# Patient Record
Sex: MALE | Race: WHITE | NOT HISPANIC OR LATINO | Employment: FULL TIME | URBAN - METROPOLITAN AREA
[De-identification: names, ages, dates, MRNs, and addresses within clinical notes are randomized per-mention and may not be internally consistent; named-entity substitution may affect disease eponyms.]

---

## 2023-05-10 ENCOUNTER — APPOINTMENT (OUTPATIENT)
Dept: PRIMARY CARE | Facility: CLINIC | Age: 38
End: 2023-05-10
Payer: COMMERCIAL

## 2023-10-13 ENCOUNTER — LAB (OUTPATIENT)
Dept: LAB | Facility: LAB | Age: 38
End: 2023-10-13

## 2023-10-13 ENCOUNTER — OFFICE VISIT (OUTPATIENT)
Dept: PRIMARY CARE | Facility: CLINIC | Age: 38
End: 2023-10-13
Payer: COMMERCIAL

## 2023-10-13 VITALS
WEIGHT: 146 LBS | SYSTOLIC BLOOD PRESSURE: 120 MMHG | BODY MASS INDEX: 22.91 KG/M2 | DIASTOLIC BLOOD PRESSURE: 70 MMHG | RESPIRATION RATE: 16 BRPM | HEIGHT: 67 IN | TEMPERATURE: 97.8 F | HEART RATE: 71 BPM

## 2023-10-13 DIAGNOSIS — Z00.00 PHYSICAL EXAM: ICD-10-CM

## 2023-10-13 DIAGNOSIS — N52.9 ERECTILE DYSFUNCTION, UNSPECIFIED ERECTILE DYSFUNCTION TYPE: Primary | ICD-10-CM

## 2023-10-13 LAB
ALBUMIN SERPL BCP-MCNC: 4.4 G/DL (ref 3.4–5)
ALP SERPL-CCNC: 35 U/L (ref 33–120)
ALT SERPL W P-5'-P-CCNC: 12 U/L (ref 10–52)
ANION GAP SERPL CALC-SCNC: 13 MMOL/L (ref 10–20)
AST SERPL W P-5'-P-CCNC: 15 U/L (ref 9–39)
BILIRUB SERPL-MCNC: 0.8 MG/DL (ref 0–1.2)
BUN SERPL-MCNC: 13 MG/DL (ref 6–23)
CALCIUM SERPL-MCNC: 9.8 MG/DL (ref 8.6–10.3)
CHLORIDE SERPL-SCNC: 106 MMOL/L (ref 98–107)
CHOLEST SERPL-MCNC: 194 MG/DL (ref 0–199)
CHOLESTEROL/HDL RATIO: 3.8
CO2 SERPL-SCNC: 30 MMOL/L (ref 21–32)
CREAT SERPL-MCNC: 0.88 MG/DL (ref 0.5–1.3)
ERYTHROCYTE [DISTWIDTH] IN BLOOD BY AUTOMATED COUNT: 12.4 % (ref 11.5–14.5)
GFR SERPL CREATININE-BSD FRML MDRD: >90 ML/MIN/1.73M*2
GLUCOSE SERPL-MCNC: 83 MG/DL (ref 74–99)
HCT VFR BLD AUTO: 46 % (ref 41–52)
HDLC SERPL-MCNC: 51.3 MG/DL
HGB BLD-MCNC: 14.8 G/DL (ref 13.5–17.5)
LDLC SERPL CALC-MCNC: 130 MG/DL
MCH RBC QN AUTO: 29.7 PG (ref 26–34)
MCHC RBC AUTO-ENTMCNC: 32.2 G/DL (ref 32–36)
MCV RBC AUTO: 92 FL (ref 80–100)
NON HDL CHOLESTEROL: 143 MG/DL (ref 0–149)
NRBC BLD-RTO: 0 /100 WBCS (ref 0–0)
PLATELET # BLD AUTO: 219 X10*3/UL (ref 150–450)
PMV BLD AUTO: 10.6 FL (ref 7.5–11.5)
POTASSIUM SERPL-SCNC: 4.9 MMOL/L (ref 3.5–5.3)
PROT SERPL-MCNC: 6.6 G/DL (ref 6.4–8.2)
RBC # BLD AUTO: 4.98 X10*6/UL (ref 4.5–5.9)
SODIUM SERPL-SCNC: 144 MMOL/L (ref 136–145)
TRIGL SERPL-MCNC: 64 MG/DL (ref 0–149)
TSH SERPL-ACNC: 0.63 MIU/L (ref 0.44–3.98)
VLDL: 13 MG/DL (ref 0–40)
WBC # BLD AUTO: 5.8 X10*3/UL (ref 4.4–11.3)

## 2023-10-13 PROCEDURE — 99213 OFFICE O/P EST LOW 20 MIN: CPT | Performed by: FAMILY MEDICINE

## 2023-10-13 PROCEDURE — 80053 COMPREHEN METABOLIC PANEL: CPT

## 2023-10-13 PROCEDURE — 84443 ASSAY THYROID STIM HORMONE: CPT

## 2023-10-13 PROCEDURE — 1036F TOBACCO NON-USER: CPT | Performed by: FAMILY MEDICINE

## 2023-10-13 PROCEDURE — 85027 COMPLETE CBC AUTOMATED: CPT

## 2023-10-13 PROCEDURE — 80061 LIPID PANEL: CPT

## 2023-10-13 PROCEDURE — 36415 COLL VENOUS BLD VENIPUNCTURE: CPT

## 2023-10-13 RX ORDER — SILDENAFIL 50 MG/1
50 TABLET, FILM COATED ORAL DAILY PRN
Qty: 10 TABLET | Refills: 3 | Status: SHIPPED | OUTPATIENT
Start: 2023-10-13 | End: 2023-11-09 | Stop reason: SDUPTHER
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
## 2023-11-03 ENCOUNTER — TELEPHONE (OUTPATIENT)
Dept: PRIMARY CARE | Facility: CLINIC | Age: 38
End: 2023-11-03
Payer: COMMERCIAL

## 2023-11-03 NOTE — TELEPHONE ENCOUNTER
Pt saw you 10/13/23 and you marked in his office notes he was here for a physical exam as well as problem visit, but only coded a 32036 but did not do a preventative visit code for the physical, only on his labs.     Can you add the Physical preventative billing code to that office visit on 10/13/23, or do you need him to come back for a separate physical visit?    He needs to have a physical exam billed to his insurance prior to 12/31/23.    Please advise.

## 2023-11-03 NOTE — TELEPHONE ENCOUNTER
He did not get a bill, he needs for insurance to get a discount on his premium.   If you prefer him to come in again for another visit to bill as a physical, instead of adding code to 10/13/23 visit, he is willing to do so

## 2023-11-09 ENCOUNTER — OFFICE VISIT (OUTPATIENT)
Dept: PRIMARY CARE | Facility: CLINIC | Age: 38
End: 2023-11-09
Payer: COMMERCIAL

## 2023-11-09 VITALS
BODY MASS INDEX: 22.71 KG/M2 | WEIGHT: 145 LBS | TEMPERATURE: 97.1 F | SYSTOLIC BLOOD PRESSURE: 110 MMHG | DIASTOLIC BLOOD PRESSURE: 70 MMHG | HEART RATE: 96 BPM

## 2023-11-09 DIAGNOSIS — E78.5 HYPERLIPIDEMIA, UNSPECIFIED HYPERLIPIDEMIA TYPE: ICD-10-CM

## 2023-11-09 DIAGNOSIS — N52.9 ERECTILE DYSFUNCTION, UNSPECIFIED ERECTILE DYSFUNCTION TYPE: Primary | ICD-10-CM

## 2023-11-09 PROBLEM — S33.5XXA LUMBAR SPRAIN: Status: ACTIVE | Noted: 2023-11-09

## 2023-11-09 PROBLEM — R31.9 HEMATURIA: Status: ACTIVE | Noted: 2023-11-09
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
## 2023-11-09 PROBLEM — S80.01XA CONTUSION OF KNEE, RIGHT: Status: ACTIVE | Noted: 2023-11-09

## 2023-11-09 PROBLEM — R21 PENILE RASH: Status: ACTIVE | Noted: 2023-11-09

## 2023-11-09 PROBLEM — S80.11XA CONTUSION OF RIGHT LOWER LEG: Status: ACTIVE | Noted: 2023-11-09

## 2023-11-09 PROBLEM — R10.9 ABDOMINAL PAIN: Status: ACTIVE | Noted: 2023-11-09

## 2023-11-09 PROBLEM — S63.501A SPRAIN OF WRIST, RIGHT: Status: ACTIVE | Noted: 2023-11-09

## 2023-11-09 PROBLEM — L72.9 SCROTAL CYST: Status: ACTIVE | Noted: 2023-11-09

## 2023-11-09 PROBLEM — N50.82 SCROTAL PAIN: Status: ACTIVE | Noted: 2023-11-09

## 2023-11-09 PROBLEM — F32.A DEPRESSION: Status: ACTIVE | Noted: 2023-11-09

## 2023-11-09 PROBLEM — F41.9 ANXIETY DISORDER: Status: ACTIVE | Noted: 2023-11-09

## 2023-11-09 PROBLEM — E66.3 OVERWEIGHT WITH BODY MASS INDEX (BMI) OF 28 TO 28.9 IN ADULT: Status: ACTIVE | Noted: 2023-11-09

## 2023-11-09 PROBLEM — G47.00 INSOMNIA: Status: ACTIVE | Noted: 2023-11-09

## 2023-11-09 PROBLEM — S70.01XA CONTUSION OF HIP, RIGHT: Status: ACTIVE | Noted: 2023-11-09

## 2023-11-09 PROBLEM — K57.32 DIVERTICULITIS OF COLON: Status: ACTIVE | Noted: 2023-11-09

## 2023-11-09 PROBLEM — L05.01 PILONIDAL CYST WITH ABSCESS: Status: ACTIVE | Noted: 2023-11-09

## 2023-11-09 PROBLEM — B37.0 ORAL THRUSH: Status: ACTIVE | Noted: 2023-11-09

## 2023-11-09 PROCEDURE — 99395 PREV VISIT EST AGE 18-39: CPT | Performed by: FAMILY MEDICINE

## 2023-11-09 PROCEDURE — 1036F TOBACCO NON-USER: CPT | Performed by: FAMILY MEDICINE

## 2023-11-09 RX ORDER — ALPRAZOLAM 0.5 MG/1
0.5 TABLET ORAL 2 TIMES DAILY PRN
COMMUNITY
Start: 2020-04-23 | End: 2024-03-08 | Stop reason: WASHOUT

## 2023-11-09 RX ORDER — ACETAMINOPHEN 325 MG/1
650 TABLET ORAL EVERY 6 HOURS PRN
COMMUNITY
Start: 2019-09-30

## 2023-11-09 RX ORDER — PERMETHRIN 50 MG/G
CREAM TOPICAL
COMMUNITY

## 2023-11-09 RX ORDER — ONDANSETRON 4 MG/1
TABLET, FILM COATED ORAL
COMMUNITY
End: 2024-03-08 | Stop reason: WASHOUT

## 2023-11-09 RX ORDER — TADALAFIL 5 MG/1
1 TABLET ORAL DAILY
COMMUNITY
Start: 2020-02-11

## 2023-11-09 RX ORDER — CLOTRIMAZOLE AND BETAMETHASONE DIPROPIONATE 10; .64 MG/G; MG/G
CREAM TOPICAL 2 TIMES DAILY
COMMUNITY
Start: 2020-02-11

## 2023-11-09 RX ORDER — SILDENAFIL 50 MG/1
50 TABLET, FILM COATED ORAL DAILY PRN
Qty: 30 TABLET | Refills: 2 | Status: SHIPPED | OUTPATIENT
Start: 2023-11-09 | End: 2024-11-08
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
## 2024-03-08 ENCOUNTER — TELEMEDICINE (OUTPATIENT)
Dept: PRIMARY CARE | Facility: CLINIC | Age: 39
End: 2024-03-08
Payer: COMMERCIAL

## 2024-03-08 ENCOUNTER — APPOINTMENT (OUTPATIENT)
Dept: PRIMARY CARE | Facility: CLINIC | Age: 39
End: 2024-03-08
Payer: COMMERCIAL

## 2024-03-08 ENCOUNTER — LAB (OUTPATIENT)
Dept: LAB | Facility: LAB | Age: 39
End: 2024-03-08
Payer: COMMERCIAL

## 2024-03-08 DIAGNOSIS — B37.0 ORAL THRUSH: ICD-10-CM

## 2024-03-08 DIAGNOSIS — R63.4 WEIGHT LOSS: ICD-10-CM

## 2024-03-08 DIAGNOSIS — B37.0 ORAL THRUSH: Primary | ICD-10-CM

## 2024-03-08 DIAGNOSIS — R70.0 ELEVATED SED RATE: ICD-10-CM

## 2024-03-08 DIAGNOSIS — R79.82 CRP ELEVATED: ICD-10-CM

## 2024-03-08 DIAGNOSIS — E78.5 HYPERLIPIDEMIA, UNSPECIFIED HYPERLIPIDEMIA TYPE: ICD-10-CM

## 2024-03-08 LAB
ALBUMIN SERPL BCP-MCNC: 4.5 G/DL (ref 3.4–5)
ALP SERPL-CCNC: 35 U/L (ref 33–120)
ALT SERPL W P-5'-P-CCNC: 12 U/L (ref 10–52)
ANION GAP SERPL CALC-SCNC: 8 MMOL/L (ref 10–20)
AST SERPL W P-5'-P-CCNC: 13 U/L (ref 9–39)
BASOPHILS # BLD AUTO: 0.05 X10*3/UL (ref 0–0.1)
BASOPHILS NFR BLD AUTO: 0.9 %
BILIRUB SERPL-MCNC: 0.7 MG/DL (ref 0–1.2)
BUN SERPL-MCNC: 14 MG/DL (ref 6–23)
CALCIUM SERPL-MCNC: 9.2 MG/DL (ref 8.6–10.3)
CHLORIDE SERPL-SCNC: 105 MMOL/L (ref 98–107)
CHOLEST SERPL-MCNC: 189 MG/DL (ref 0–199)
CHOLESTEROL/HDL RATIO: 3.3
CO2 SERPL-SCNC: 32 MMOL/L (ref 21–32)
CREAT SERPL-MCNC: 0.81 MG/DL (ref 0.5–1.3)
CRP SERPL-MCNC: <0.1 MG/DL
EGFRCR SERPLBLD CKD-EPI 2021: >90 ML/MIN/1.73M*2
EOSINOPHIL # BLD AUTO: 0.05 X10*3/UL (ref 0–0.7)
EOSINOPHIL NFR BLD AUTO: 0.9 %
ERYTHROCYTE [DISTWIDTH] IN BLOOD BY AUTOMATED COUNT: 12.5 % (ref 11.5–14.5)
ERYTHROCYTE [SEDIMENTATION RATE] IN BLOOD BY WESTERGREN METHOD: 3 MM/H (ref 0–15)
GLUCOSE SERPL-MCNC: 91 MG/DL (ref 74–99)
HCT VFR BLD AUTO: 45.4 % (ref 41–52)
HDLC SERPL-MCNC: 57.9 MG/DL
HGB BLD-MCNC: 14.5 G/DL (ref 13.5–17.5)
HIV 1+2 AB+HIV1 P24 AG SERPL QL IA: NONREACTIVE
IMM GRANULOCYTES # BLD AUTO: 0.02 X10*3/UL (ref 0–0.7)
IMM GRANULOCYTES NFR BLD AUTO: 0.4 % (ref 0–0.9)
LDLC SERPL CALC-MCNC: 118 MG/DL
LYMPHOCYTES # BLD AUTO: 1.76 X10*3/UL (ref 1.2–4.8)
LYMPHOCYTES NFR BLD AUTO: 32.8 %
MCH RBC QN AUTO: 29.7 PG (ref 26–34)
MCHC RBC AUTO-ENTMCNC: 31.9 G/DL (ref 32–36)
MCV RBC AUTO: 93 FL (ref 80–100)
MONOCYTES # BLD AUTO: 0.45 X10*3/UL (ref 0.1–1)
MONOCYTES NFR BLD AUTO: 8.4 %
NEUTROPHILS # BLD AUTO: 3.03 X10*3/UL (ref 1.2–7.7)
NEUTROPHILS NFR BLD AUTO: 56.6 %
NON HDL CHOLESTEROL: 131 MG/DL (ref 0–149)
NRBC BLD-RTO: 0 /100 WBCS (ref 0–0)
PLATELET # BLD AUTO: 210 X10*3/UL (ref 150–450)
POTASSIUM SERPL-SCNC: 3.8 MMOL/L (ref 3.5–5.3)
PROT SERPL-MCNC: 6.8 G/DL (ref 6.4–8.2)
RBC # BLD AUTO: 4.88 X10*6/UL (ref 4.5–5.9)
SODIUM SERPL-SCNC: 141 MMOL/L (ref 136–145)
TRIGL SERPL-MCNC: 65 MG/DL (ref 0–149)
TSH SERPL-ACNC: 1.28 MIU/L (ref 0.44–3.98)
VLDL: 13 MG/DL (ref 0–40)
WBC # BLD AUTO: 5.4 X10*3/UL (ref 4.4–11.3)

## 2024-03-08 PROCEDURE — 86140 C-REACTIVE PROTEIN: CPT

## 2024-03-08 PROCEDURE — 80061 LIPID PANEL: CPT

## 2024-03-08 PROCEDURE — 1036F TOBACCO NON-USER: CPT | Performed by: FAMILY MEDICINE

## 2024-03-08 PROCEDURE — 85652 RBC SED RATE AUTOMATED: CPT

## 2024-03-08 PROCEDURE — 80053 COMPREHEN METABOLIC PANEL: CPT

## 2024-03-08 PROCEDURE — 99214 OFFICE O/P EST MOD 30 MIN: CPT | Performed by: FAMILY MEDICINE

## 2024-03-08 PROCEDURE — 87389 HIV-1 AG W/HIV-1&-2 AB AG IA: CPT

## 2024-03-08 PROCEDURE — 36415 COLL VENOUS BLD VENIPUNCTURE: CPT

## 2024-03-08 PROCEDURE — 86696 HERPES SIMPLEX TYPE 2 TEST: CPT

## 2024-03-08 PROCEDURE — 86780 TREPONEMA PALLIDUM: CPT

## 2024-03-08 PROCEDURE — 84443 ASSAY THYROID STIM HORMONE: CPT

## 2024-03-08 PROCEDURE — 85025 COMPLETE CBC W/AUTO DIFF WBC: CPT

## 2024-03-08 PROCEDURE — 86695 HERPES SIMPLEX TYPE 1 TEST: CPT

## 2024-03-08 RX ORDER — AMOXICILLIN 500 MG/1
500 TABLET, FILM COATED ORAL EVERY 12 HOURS SCHEDULED
Qty: 14 TABLET | Refills: 0 | Status: SHIPPED | OUTPATIENT
Start: 2024-03-08 | End: 2024-03-15

## 2024-03-08 RX ORDER — FLUCONAZOLE 150 MG/1
150 TABLET ORAL ONCE
Qty: 1 TABLET | Refills: 0 | Status: SHIPPED | OUTPATIENT
Start: 2024-03-08 | End: 2024-03-08

## 2024-03-08 NOTE — PROGRESS NOTES
Virtual or Telephone Consent    An interactive audio and video telecommunication system which permits real time communications between the patient (at the originating site) and provider (at the distant site) was utilized to provide this telehealth service.   Verbal consent was requested and obtained from Samuel Manriquez on this date, 03/08/24 for a telehealth visit.       Subjective   Patient ID: Samuel Manriquez is a 38 y.o. male who presents for oral thrush   HPI  Patient contacted us today for oral thrush that he noted 3 days ago.  He reported that he had this issue back in 2019, requiring biopsy.  Eventually he got strep throat, and COVID-19 simultaneously and treated with amoxicillin which treated infection.  Now he has whitish thrush on the tongue and the cheeks, and he noted them when he scrapes his tongue.  No fever, no chills, no change bowel habits, vision lifestyle.  There is antibiotic use.  Patient is adamant that amoxicillin help treat his previous oral thrush.  Review of Systems    Past Medical History:   Diagnosis Date    Personal history of diseases of the skin and subcutaneous tissue     History of pilonidal cyst    Personal history of other mental and behavioral disorders 06/09/2013    History of anxiety disorder    Rash and other nonspecific skin eruption     Rash       Past Surgical History:   Procedure Laterality Date    OTHER SURGICAL HISTORY  07/24/2013    Surgery Testis    PILONIDAL CYST DRAINAGE  07/24/2013    Pilonidal Cyst Resection      Social History     Socioeconomic History    Marital status: Single     Spouse name: Not on file    Number of children: Not on file    Years of education: Not on file    Highest education level: Not on file   Occupational History    Not on file   Tobacco Use    Smoking status: Former     Types: Cigarettes    Smokeless tobacco: Never   Substance and Sexual Activity    Alcohol use: Yes    Drug use: Yes     Types: Marijuana    Sexual activity: Not on  file   Other Topics Concern    Not on file   Social History Narrative    Not on file     Social Determinants of Health     Financial Resource Strain: Not on file   Food Insecurity: Not on file   Transportation Needs: Not on file   Physical Activity: Not on file   Stress: Not on file   Social Connections: Not on file   Intimate Partner Violence: Not on file   Housing Stability: Not on file      Family History   Problem Relation Name Age of Onset    Heart block Mother      Heart block Father         MEDICATIONS AND ALLERGIES:    ALLERGIES Patient has no known allergies.    MEDICATIONS   Current Outpatient Medications on File Prior to Visit   Medication Sig Dispense Refill    acetaminophen (Tylenol) 325 mg tablet Take 2 tablets (650 mg) by mouth every 6 hours if needed.      clotrimazole-betamethasone (Lotrisone) cream Apply topically twice a day. (AM AND PM).      permethrin (Elimite) 5 % cream       sildenafil (Viagra) 50 mg tablet Take 1 tablet (50 mg) by mouth once daily as needed for erectile dysfunction. 30 tablet 2    tadalafil (Cialis) 5 mg tablet Take 1 tablet (5 mg) by mouth once daily.      [DISCONTINUED] ALPRAZolam (Xanax) 0.5 mg tablet Take 1 tablet (0.5 mg) by mouth 2 times a day as needed for anxiety.      [DISCONTINUED] ondansetron (Zofran) 4 mg tablet        No current facility-administered medications on file prior to visit.        Objective   Visit Vitals  Smoking Status Former      Physical Exam    On this video visit, we are able to notice white thrush on the tongue.      1. Oral thrush  For the oral thrush, will order the labs below, will treat with Diflucan, patient wanted amoxicillin, we advised her to use Diflucan first, he can try the amoxicillin if Diflucan does not work, however we explained that oral thrush most likely Candida and require antifungal and antibiotics.  There is always a chance that antibiotic makes it worse.  Patient understand the risk.  - CBC and Auto Differential;  Future  - Comprehensive Metabolic Panel; Future  - HIV 1/2 Antigen/Antibody Screen with Reflex to Confirmation; Future  - HSV1 IgG and HSV2 IgG; Future  - Syphilis Screen with Reflex; Future  - fluconazole (Diflucan) 150 mg tablet; Take 1 tablet (150 mg) by mouth 1 time for 1 dose.  Dispense: 1 tablet; Refill: 0  - amoxicillin (Amoxil) 500 mg tablet; Take 1 tablet (500 mg) by mouth every 12 hours for 7 days.  Dispense: 14 tablet; Refill: 0  - Referral to Gastroenterology; Future  - TSH with reflex to Free T4 if abnormal; Future    2. Weight loss  Will check the thyroid level, patient scheduled with dietitian.  - TSH with reflex to Free T4 if abnormal; Future    3. CRP elevated  Will check inflammatory markers  - TSH with reflex to Free T4 if abnormal; Future    4. Elevated sed rate  We will check for elevated sed rate indicating inflammation.  - TSH with reflex to Free T4 if abnormal; Future

## 2024-03-09 LAB
HERPES SIMPLEX VIRUS 1 IGG: <0.2 INDEX
HERPES SIMPLEX VIRUS 2 IGG: <0.2 INDEX
TREPONEMA PALLIDUM IGG+IGM AB [PRESENCE] IN SERUM OR PLASMA BY IMMUNOASSAY: NONREACTIVE

## 2024-03-12 ENCOUNTER — TELEPHONE (OUTPATIENT)
Dept: PRIMARY CARE | Facility: CLINIC | Age: 39
End: 2024-03-12
Payer: COMMERCIAL

## 2024-03-12 DIAGNOSIS — B37.0 ORAL THRUSH: Primary | ICD-10-CM

## 2024-03-12 RX ORDER — FLUCONAZOLE 100 MG/1
TABLET ORAL
Qty: 15 TABLET | Refills: 0 | Status: SHIPPED | OUTPATIENT
Start: 2024-03-12

## 2024-03-20 ENCOUNTER — LAB (OUTPATIENT)
Dept: LAB | Facility: LAB | Age: 39
End: 2024-03-20
Payer: COMMERCIAL

## 2024-03-20 DIAGNOSIS — B37.0 ORAL THRUSH: ICD-10-CM

## 2024-03-20 LAB
ALBUMIN SERPL BCP-MCNC: 4.2 G/DL (ref 3.4–5)
ALP SERPL-CCNC: 32 U/L (ref 33–120)
ALT SERPL W P-5'-P-CCNC: 12 U/L (ref 10–52)
ANION GAP SERPL CALC-SCNC: 10 MMOL/L (ref 10–20)
AST SERPL W P-5'-P-CCNC: 14 U/L (ref 9–39)
BILIRUB SERPL-MCNC: 0.7 MG/DL (ref 0–1.2)
BUN SERPL-MCNC: 12 MG/DL (ref 6–23)
CALCIUM SERPL-MCNC: 9.3 MG/DL (ref 8.6–10.3)
CHLORIDE SERPL-SCNC: 105 MMOL/L (ref 98–107)
CO2 SERPL-SCNC: 31 MMOL/L (ref 21–32)
CREAT SERPL-MCNC: 0.79 MG/DL (ref 0.5–1.3)
EGFRCR SERPLBLD CKD-EPI 2021: >90 ML/MIN/1.73M*2
GLUCOSE SERPL-MCNC: 96 MG/DL (ref 74–99)
POTASSIUM SERPL-SCNC: 4.7 MMOL/L (ref 3.5–5.3)
PROT SERPL-MCNC: 6.9 G/DL (ref 6.4–8.2)
SODIUM SERPL-SCNC: 141 MMOL/L (ref 136–145)

## 2024-03-20 PROCEDURE — 36415 COLL VENOUS BLD VENIPUNCTURE: CPT

## 2024-03-20 PROCEDURE — 80053 COMPREHEN METABOLIC PANEL: CPT

## 2024-03-21 ENCOUNTER — PATIENT MESSAGE (OUTPATIENT)
Dept: PRIMARY CARE | Facility: CLINIC | Age: 39
End: 2024-03-21
Payer: COMMERCIAL

## 2024-03-21 ENCOUNTER — TELEPHONE (OUTPATIENT)
Dept: PRIMARY CARE | Facility: CLINIC | Age: 39
End: 2024-03-21
Payer: COMMERCIAL

## 2024-03-21 DIAGNOSIS — B37.0 ORAL THRUSH: Primary | ICD-10-CM

## 2024-03-21 NOTE — TELEPHONE ENCOUNTER
----- Message from Samuel Penaloza MD sent at 3/21/2024  2:27 PM EDT -----  Liver enzyme is normal   How is the rash looking ?

## 2024-04-23 ENCOUNTER — APPOINTMENT (OUTPATIENT)
Dept: OTOLARYNGOLOGY | Facility: CLINIC | Age: 39
End: 2024-04-23
Payer: COMMERCIAL

## 2024-04-27 ENCOUNTER — OFFICE VISIT (OUTPATIENT)
Dept: OTOLARYNGOLOGY | Facility: CLINIC | Age: 39
End: 2024-04-27
Payer: COMMERCIAL

## 2024-04-27 VITALS — HEIGHT: 67 IN | BODY MASS INDEX: 22.76 KG/M2 | WEIGHT: 145 LBS

## 2024-04-27 DIAGNOSIS — K14.0 CHRONIC GLOSSITIS: Primary | ICD-10-CM

## 2024-04-27 PROCEDURE — 1036F TOBACCO NON-USER: CPT | Performed by: OTOLARYNGOLOGY

## 2024-04-27 PROCEDURE — 99203 OFFICE O/P NEW LOW 30 MIN: CPT | Performed by: OTOLARYNGOLOGY

## 2024-04-27 RX ORDER — NYSTATIN 100000 [USP'U]/ML
SUSPENSION ORAL
Qty: 240 ML | Refills: 1 | Status: SHIPPED | OUTPATIENT
Start: 2024-04-27

## 2024-04-27 ASSESSMENT — ENCOUNTER SYMPTOMS: ROS GI COMMENTS: HEARTBURN

## 2024-04-27 NOTE — PROGRESS NOTES
Subjective   Patient ID: Samuel Manriquez is a 38 y.o. male  HPI  Patient is complaining of a chronic 5-year history of recurrent episodes of thrush.  He has no hemoptysis or dysphagia and no pain or burning sensation in his tongue.      Review of Systems   HENT:          Loss of taste   Gastrointestinal:         Heartburn       Objective   Physical Exam  The following elements of a brief ear nose and throat exam were performed: External ear canals and tympanic membranes, external nose and nasal passages, oral cavity, palpation of the neck, percussion of the face, palpation of the thyroid.    There is extensive thrush noted on the cheeks and on the tongue.  The remainder of his exam was within normal limits.  Assessment/Plan   Diagnoses and all orders for this visit:  Chronic glossitis (Primary)  -     nystatin (Mycostatin) 100,000 unit/mL suspension; 240 mL of nystatin mixed with 4 g of tetracycline and 100 mg of hydrocortisone. 5 mL swish and spit 3 times a day.     Chronic glossitis and stomatitis with recurrent oral candidiasis.  The patient was started on a mouthwash consisting of nystatin and hydrocortisone and tetracycline and he will follow-up in 4 weeks.

## 2024-05-21 ENCOUNTER — APPOINTMENT (OUTPATIENT)
Dept: GASTROENTEROLOGY | Facility: CLINIC | Age: 39
End: 2024-05-21
Payer: COMMERCIAL

## 2024-06-24 ENCOUNTER — TELEPHONE (OUTPATIENT)
Dept: OTOLARYNGOLOGY | Facility: CLINIC | Age: 39
End: 2024-06-24
Payer: COMMERCIAL

## 2024-06-29 ENCOUNTER — OFFICE VISIT (OUTPATIENT)
Dept: OTOLARYNGOLOGY | Facility: CLINIC | Age: 39
End: 2024-06-29
Payer: COMMERCIAL

## 2024-06-29 VITALS — HEIGHT: 67 IN | WEIGHT: 145 LBS | BODY MASS INDEX: 22.76 KG/M2

## 2024-06-29 DIAGNOSIS — K14.0 CHRONIC GLOSSITIS: Primary | ICD-10-CM

## 2024-06-29 PROCEDURE — 99213 OFFICE O/P EST LOW 20 MIN: CPT | Performed by: OTOLARYNGOLOGY

## 2024-06-29 PROCEDURE — 1036F TOBACCO NON-USER: CPT | Performed by: OTOLARYNGOLOGY

## 2024-06-29 RX ORDER — NYSTATIN 100000 [USP'U]/ML
SUSPENSION ORAL
Qty: 240 ML | Refills: 3 | Status: SHIPPED | OUTPATIENT
Start: 2024-06-29

## 2024-06-29 NOTE — PROGRESS NOTES
Subjective   Patient ID: Samuel Manriquez is a 38 y.o. male  HPI  Patient presents for follow-up for chronic glossitis.  He was feeling much better using the Magic mix however approximately 2 weeks ago he started having recurrence of the tongue irritation and thrush.    Review of Systems    Objective   Physical Exam  There is thrush noted on the dorsum and lateral surface of the tongue and cheeks.  There is no ulceration or induration noted.    Assessment/Plan   Diagnoses and all orders for this visit:  Chronic glossitis (Primary)     Chronic glossitis and stomatitis most likely secondary to candidiasis and mixed geoff with the recurrence.  The patient may be causing irritation to the oral mucosa with frequent smoking of marijuana.  He was restarted on the mouthwash consisting of tetracycline and hydrocortisone and nystatin for a full 6 weeks.  He will follow-up in 2 months.

## 2024-09-16 ENCOUNTER — APPOINTMENT (OUTPATIENT)
Dept: PRIMARY CARE | Facility: CLINIC | Age: 39
End: 2024-09-16
Payer: COMMERCIAL

## 2024-09-16 VITALS
RESPIRATION RATE: 16 BRPM | WEIGHT: 138 LBS | SYSTOLIC BLOOD PRESSURE: 121 MMHG | BODY MASS INDEX: 21.66 KG/M2 | DIASTOLIC BLOOD PRESSURE: 74 MMHG | HEIGHT: 67 IN | HEART RATE: 70 BPM

## 2024-09-16 DIAGNOSIS — F90.9 ATTENTION DEFICIT HYPERACTIVITY DISORDER (ADHD), UNSPECIFIED ADHD TYPE: Primary | ICD-10-CM

## 2024-09-16 PROCEDURE — 1036F TOBACCO NON-USER: CPT | Performed by: FAMILY MEDICINE

## 2024-09-16 PROCEDURE — 3008F BODY MASS INDEX DOCD: CPT | Performed by: FAMILY MEDICINE

## 2024-09-16 PROCEDURE — 99213 OFFICE O/P EST LOW 20 MIN: CPT | Performed by: FAMILY MEDICINE

## 2024-09-16 NOTE — PROGRESS NOTES
Subjective   Patient ID: Samuel Manriquez is a 38 y.o. male who presents for Follow-up.  HPI  PATIENT HAS BEEN WITH PSYCHOLOGIST FOR 5 YEARS.   He is requesting treatment for ADHD   He is still following with ENT for the glossitis.  He is realizing that he is struggling with ADHD   Diagnosed as a child, was treated with adderal.   He stopped as a senior .   Tried in his 20s , he struggled so he stopped taking it.   Impulsivity , emotional regulation , executive dysfunction.           Review of Systems    Past Medical History:   Diagnosis Date    Personal history of diseases of the skin and subcutaneous tissue     History of pilonidal cyst    Personal history of other mental and behavioral disorders 06/09/2013    History of anxiety disorder    Rash and other nonspecific skin eruption     Rash       Past Surgical History:   Procedure Laterality Date    OTHER SURGICAL HISTORY  07/24/2013    Surgery Testis    PILONIDAL CYST DRAINAGE  07/24/2013    Pilonidal Cyst Resection      Social History     Socioeconomic History    Marital status: Single   Tobacco Use    Smoking status: Former     Types: Cigarettes    Smokeless tobacco: Never   Substance and Sexual Activity    Alcohol use: Yes    Drug use: Yes     Types: Marijuana      Family History   Problem Relation Name Age of Onset    Heart block Mother      Heart block Father      Hearing loss Other         MEDICATIONS AND ALLERGIES:    ALLERGIES Patient has no known allergies.    MEDICATIONS   Current Outpatient Medications on File Prior to Visit   Medication Sig Dispense Refill    acetaminophen (Tylenol) 325 mg tablet Take 2 tablets (650 mg) by mouth every 6 hours if needed.      clotrimazole-betamethasone (Lotrisone) cream Apply topically twice a day. (AM AND PM).      fluconazole (Diflucan) 100 mg tablet Take 2 tabs on day 1, then 1 tab daily daily for 13 more days ( 14 day treatment) (Patient not taking: Reported on 9/16/2024) 15 tablet 0    nystatin (Mycostatin)  "100,000 unit/mL suspension 240 mL of nystatin mixed with 4 g of tetracycline and 100 mg of hydrocortisone. 5 mL swish and spit 3 times a day. 240 mL 1    nystatin (Mycostatin) 100,000 unit/mL suspension 240 mL of nystatin mixed with 4 g of tetracycline and 100 mg of hydrocortisone. 5 mL swish and spit 3 times a day. 240 mL 3    permethrin (Elimite) 5 % cream       sildenafil (Viagra) 50 mg tablet Take 1 tablet (50 mg) by mouth once daily as needed for erectile dysfunction. (Patient not taking: Reported on 9/16/2024) 30 tablet 2    [DISCONTINUED] tadalafil (Cialis) 5 mg tablet Take 1 tablet (5 mg) by mouth once daily.       No current facility-administered medications on file prior to visit.              Objective   Visit Vitals  /74   Pulse 70   Resp 16   Ht 1.702 m (5' 7\")   Wt 62.6 kg (138 lb)   BMI 21.61 kg/m²   Smoking Status Former   BSA 1.72 m²          2/11/2020     9:41 AM 6/17/2020     1:49 PM 10/13/2023    11:10 AM 11/9/2023    11:24 AM 4/27/2024    11:04 AM 6/29/2024    10:56 AM 9/16/2024     9:48 AM   Vitals   Systolic 122 90 120 110   121   Diastolic 66 57 70 70   74   Heart Rate 80 83 71 96   70   Temp   36.6 °C (97.8 °F) 36.2 °C (97.1 °F)      Resp  18 16    16   Height (in) 1.702 m (5' 7\")  1.702 m (5' 7\")  1.702 m (5' 7\") 1.702 m (5' 7\") 1.702 m (5' 7\")   Weight (lb) 161 181 146 145 145 145 138   BMI 25.22 kg/m2 28.35 kg/m2 22.87 kg/m2 22.71 kg/m2 22.71 kg/m2 22.71 kg/m2 21.61 kg/m2   BSA (m2) 1.86 m2 1.97 m2 1.77 m2 1.76 m2 1.76 m2 1.76 m2 1.72 m2   Visit Report   Report Report Report Report Report     Physical Exam        Assessment & Plan  Attention deficit hyperactivity disorder (ADHD), unspecified ADHD type  Advised patient to provide the documents from his psycholoigst , we would review and treat as indicated   We advised risk of stimulants, and the controlled substance agreement, we explaiend risk of taking them in bipolar disorder, patient verbalized understandinga , we also provided " contact information for psychiatrist.

## 2024-10-11 ENCOUNTER — TELEPHONE (OUTPATIENT)
Dept: OTOLARYNGOLOGY | Facility: CLINIC | Age: 39
End: 2024-10-11
Payer: COMMERCIAL

## 2024-10-25 DIAGNOSIS — N52.9 ERECTILE DYSFUNCTION, UNSPECIFIED ERECTILE DYSFUNCTION TYPE: ICD-10-CM

## 2024-10-25 RX ORDER — SILDENAFIL 50 MG/1
50 TABLET, FILM COATED ORAL DAILY PRN
Qty: 30 TABLET | Refills: 2 | Status: SHIPPED | OUTPATIENT
Start: 2024-10-25 | End: 2025-10-25

## 2024-12-16 ENCOUNTER — TELEPHONE (OUTPATIENT)
Dept: PRIMARY CARE | Facility: CLINIC | Age: 39
End: 2024-12-16
Payer: COMMERCIAL

## 2024-12-16 DIAGNOSIS — N52.9 ERECTILE DYSFUNCTION, UNSPECIFIED ERECTILE DYSFUNCTION TYPE: ICD-10-CM

## 2024-12-16 DIAGNOSIS — F90.9 ATTENTION DEFICIT HYPERACTIVITY DISORDER (ADHD), UNSPECIFIED ADHD TYPE: Primary | ICD-10-CM

## 2024-12-16 RX ORDER — TADALAFIL 20 MG/1
20 TABLET ORAL DAILY PRN
Qty: 10 TABLET | Refills: 0 | Status: SHIPPED | OUTPATIENT
Start: 2024-12-16 | End: 2025-01-15

## 2024-12-16 RX ORDER — METHYLPHENIDATE HYDROCHLORIDE 10 MG/1
10 TABLET ORAL 2 TIMES DAILY
Qty: 60 TABLET | Refills: 0 | Status: SHIPPED | OUTPATIENT
Start: 2024-12-16 | End: 2025-01-15

## 2024-12-16 NOTE — TELEPHONE ENCOUNTER
Patient is calling in asking about ADHD medication and Per Tremaine he needed to be seen by psych to get evaluated consult and testing are scanned under media would you prescribed medication until  returns. Patient is also stating that the sildenafil 50 mg isn't working asking if you can send something else in.     1--chart reviewed.   2--CIALIS/TADALAFIL TRIAL IN PLACE OF [facility].    3--ED SXS ARE KNOWN COMPLICATION OF ADD TX WITH STIMULANTS.    4--BROWN EF/A SCORED [id number]-[id number] 70s:   NO IMPRESSION IN THE REPORT BUT AS I REVIEW SCORING:    The Brown EF/A Scales use T scores to report results. T scores are linear transformations of raw scores, and have a mean of 50 and a standard deviation of 10. T scores can be interpreted as follows: PT SCORES:   70 and above: Markedly atypical (very significant problem)  HERE   60-69: Moderately atypical (significant problem) AND HERE   55-59: Somewhat atypical (possibly significant problem)  54 and below: Typical (unlikely significant problem)  CHILD AND FAMILY PSYCHOLOGY LETTER APPRECIATED:  LOOKS LIKE PT IS APPROPRIATE FOR TX.      [facility] CALLED PT AT 1832 HRS TO REVIEW PLAN.    [facility] TO [facility].  HX OF RITALIN AND ADDERALL TX HISTORICALLY.  PDE4 SFX AND CAUTIONS TO PT.    Pt expresses understanding and appreciation.

## 2025-01-17 ENCOUNTER — TELEMEDICINE (OUTPATIENT)
Dept: PRIMARY CARE | Facility: CLINIC | Age: 40
End: 2025-01-17
Payer: COMMERCIAL

## 2025-01-17 DIAGNOSIS — F12.90 MARIJUANA USE: ICD-10-CM

## 2025-01-17 DIAGNOSIS — F90.9 ATTENTION DEFICIT HYPERACTIVITY DISORDER (ADHD), UNSPECIFIED ADHD TYPE: Primary | ICD-10-CM

## 2025-01-17 PROCEDURE — 99214 OFFICE O/P EST MOD 30 MIN: CPT | Performed by: FAMILY MEDICINE

## 2025-01-17 RX ORDER — METHYLPHENIDATE HYDROCHLORIDE 20 MG/1
10 TABLET ORAL 2 TIMES DAILY
Qty: 60 TABLET | Refills: 0 | Status: SHIPPED | OUTPATIENT
Start: 2025-01-17 | End: 2025-02-16

## 2025-01-17 NOTE — PATIENT INSTRUCTIONS
USE [facility].  CALL FOR NEEDS [phone].   KEEP ON MEDICATIONS  KEEP SPECIALTY APPOINTMENTS.    RITALIN 20 MG TWICE DAILY INCREASED FROM 10 MG TWICE DAILY.    RETURN IN 2 WEEKS OR TV OR OV FOLLOW UP.

## 2025-01-17 NOTE — PROGRESS NOTES
Subjective   Patient ID: Samuel Manriquez is a 39 y.o. male who presents for No chief complaint on file..    HPI   Virtual or Telephone Consent    A telephone visit (audio only) between the patient (at the originating site) and the provider (at the distant site) was utilized to provide this telehealth service.   Verbal consent was requested and obtained from Samuel Manriquez on this date, 01/17/25 for a telehealth visit.     Dr ROSEN PT.    CROSS COVERAGE.    ADHD MED MANAGEMENT  INTERVAL STARTED ON QD RITALIN.    BID DOSING AND NOW RUN OUT.      Life event and moving his home.    PAST WORK UP NOT BIPOLAR DISORDER.     WITH PARTNER THIS WEEK HAS PT DISTRESSED.      HERE FOR REFILLS OF STIMULANTS:    INTERVAL: OK ON   REPORTS BENEFIT ON MED.   SFX:  NO  DIVERSION: NO  CHANGES NEEDED: MAYBE TO 20 BID.    OARRS: 12/16/24 #60 AND PLENTY OF CBD AND VAPES.    UDS: tba    ON: 13:20/13:22  OUT: 1337  DURATION:  17  RTO:  2 WEEKS     EXAM: LIMITED VIRTUAL EXAM, APPROPRIATE.      Review of Systems   All other systems reviewed and are negative.      Objective   There were no vitals taken for this visit.    DDX:  BIPOLAR VS ADD AND DEPRESSION  REVIEWED THE DDX AND POSSIBLE BIPOLAR D/O MED USE AND LATE DX AND SEROQUEL MAY BE OF BENEFIT.      LONG DISCUSSION AND OFFERING CARE WITH PT FOR CURRENT AND POSSIBLY OTHER DX OR ETIOLOGIES.  OK TO CHANGE PCP TO [facility].      Physical Exam  limited on phone.      Assessment/Plan   Assessment & Plan  Attention deficit hyperactivity disorder (ADHD), unspecified ADHD type    Orders:    DRUG SCREEN,URINE; Future    methylphenidate (Ritalin) 20 mg tablet; Take 0.5 tablets (10 mg) by mouth 2 times a day.    Follow Up In Primary Care - Established; Future    Marijuana use

## 2025-02-05 DIAGNOSIS — F90.9 ATTENTION DEFICIT HYPERACTIVITY DISORDER (ADHD), UNSPECIFIED ADHD TYPE: Primary | ICD-10-CM

## 2025-02-05 DIAGNOSIS — F12.90 MARIJUANA USE: ICD-10-CM

## 2025-02-07 ENCOUNTER — APPOINTMENT (OUTPATIENT)
Dept: PRIMARY CARE | Facility: CLINIC | Age: 40
End: 2025-02-07
Payer: COMMERCIAL

## 2025-02-07 DIAGNOSIS — F90.9 ATTENTION DEFICIT HYPERACTIVITY DISORDER (ADHD), UNSPECIFIED ADHD TYPE: ICD-10-CM

## 2025-02-07 DIAGNOSIS — F43.21 GRIEF REACTION: Primary | ICD-10-CM

## 2025-02-07 PROCEDURE — 99214 OFFICE O/P EST MOD 30 MIN: CPT | Performed by: FAMILY MEDICINE

## 2025-02-07 PROCEDURE — 1036F TOBACCO NON-USER: CPT | Performed by: FAMILY MEDICINE

## 2025-02-07 PROCEDURE — G2211 COMPLEX E/M VISIT ADD ON: HCPCS | Performed by: FAMILY MEDICINE

## 2025-02-07 RX ORDER — METHYLPHENIDATE HYDROCHLORIDE 20 MG/1
20 TABLET ORAL 2 TIMES DAILY
Qty: 60 TABLET | Refills: 0 | Status: SHIPPED | OUTPATIENT
Start: 2025-02-07 | End: 2025-03-09

## 2025-02-07 RX ORDER — BUPROPION HYDROCHLORIDE 150 MG/1
150 TABLET ORAL EVERY MORNING
Qty: 30 TABLET | Refills: 1 | Status: SHIPPED | OUTPATIENT
Start: 2025-02-07 | End: 2025-04-08

## 2025-02-07 NOTE — PATIENT INSTRUCTIONS
USE [facility].  CALL FOR NEEDS [phone].   KEEP ON MEDICATIONS  KEEP SPECIALTY APPOINTMENTS.      CALL FOR PSYCHIATRY CARE:    COMMUNITY SUPPORT SERVICES:  CSS:  [phone].  [phone]    [facility]:    CALL:  Phone Number: [phone]  Website: [email or web address]  KAMARI Kramer @ [facility]   Address: [place] [address]    PSYCHIATRY REFERRAL:  : FOR MENTAL HEALTH CALL DR ALEE MORGAN: [phone]     3 CRISIS HOTLINE.      RITALIN 20 MG TWICE DAILY #60 HAS BEEN ORDERED.

## 2025-02-07 NOTE — LETTER
February 7, 2025     Patient: Samuel Manriquez   YOB: 1985   Date of Visit: 2/7/2025     To Whom It May Concern:    Samuel Manriquez was seen in my clinic on 2/7/2025 at 11:40 am. Please excuse Samuel for his absence from work on this day to make the appointment.    My patient HAS MEDICAL NEED FOR TIME OFF WORK FOR RECOVERY FROM A MEDICAL CRISIS.        If you have any questions or concerns, please don't hesitate to call: DR RYAN [phone].           Sincerely,         Wei Ryan MD        CC: No Recipients

## 2025-02-07 NOTE — PROGRESS NOTES
Subjective   Patient ID: Samuel Manriquez is a 39 y.o. male who presents for No chief complaint on file..    HPI   Virtual or Telephone Consent    A telephone visit (audio only) between the patient (at the originating site) and the provider (at the distant site) was utilized to provide this telehealth service.   Verbal consent was requested and obtained from Samuel Manriquez on this date, 02/07/25 for a telehealth visit.       11:40 APPT  12:20 ON  OUT: 12:54  DURATION 34 MINUTES  RTO: 1 WEEK CALL SOONER IF NEEDED.    INTERVAL 10 BID TO 20 BID.    RESPONSE:    DIFFICULTY GETTING SUPPLY.  20 MG BID NOT 10 MG AND 1/2 TAB DOSE.    SOME APPETITE SUPPRESSION.     I AM IN ACTIVE CRISIS RIGHT NOW.   I WORK FROM HOME AND SEEKING NEW RESIDENCE AND LAY OFFS IN APRIL 2025 IS COMING AND VERY DISTRESSED RIGHT NOW.    WIFE TOOK THE KIDS AWAY YESTERDAY.      SURVIVOR OF SEXUAL AND VERBAL ABUSES.    I HAVE FRIENDS IN [place] BUT FAR AWAY.      NO SI NO HI.   JUST ATE FOR THE FIRST TIME YESTERDAY.   REPORTS FEELING SAFE WHERE HE IS IN CAR NOW.      HERE FOR REFILLS OF STIMULANTS:    INTERVAL:   STRESSORS SEE ABOVE.    REPORTS BENEFIT ON MED.   SFX:  NO  DIVERSION: NO  CHANGES NEEDED: NO  OARRS:   UDS: tba    APPT:    ON:   OUT:   DURATION:    RTO:     EXAM: LIMITED VIRTUAL EXAM, TEARFUL CRYING AND THEN COMPOSED. APPROPRIATE.      I GIVE PT INFORMATION:    CALL FOR PSYCHIATRY CARE:    COMMUNITY SUPPORT SERVICES:  CSS:  [phone].  [phone]    [facility]:    CALL:  Phone Number: [phone]  Website: [email or web address]  KAMARI Kramer @ [facility]   Address: [place] [address] [address].      Review of Systems    Objective   There were no vitals taken for this visit.    Physical Exam    Assessment/Plan   Assessment & Plan  Attention deficit hyperactivity disorder (ADHD), unspecified ADHD type    Orders:    Follow Up In Primary Care - Established

## 2025-04-29 DIAGNOSIS — F90.9 ATTENTION DEFICIT HYPERACTIVITY DISORDER (ADHD), UNSPECIFIED ADHD TYPE: ICD-10-CM

## 2025-04-29 DIAGNOSIS — F43.20 GRIEF REACTION: ICD-10-CM

## 2025-04-29 RX ORDER — BUPROPION HYDROCHLORIDE 150 MG/1
150 TABLET ORAL EVERY MORNING
Qty: 90 TABLET | Refills: 1 | Status: SHIPPED | OUTPATIENT
Start: 2025-04-29 | End: 2025-06-28

## 2025-04-29 RX ORDER — BUPROPION HYDROCHLORIDE 150 MG/1
150 TABLET ORAL EVERY MORNING
Qty: 30 TABLET | Refills: 1 | Status: SHIPPED | OUTPATIENT
Start: 2025-04-29 | End: 2025-04-29